# Patient Record
Sex: FEMALE
[De-identification: names, ages, dates, MRNs, and addresses within clinical notes are randomized per-mention and may not be internally consistent; named-entity substitution may affect disease eponyms.]

---

## 2020-10-14 PROBLEM — Z00.00 ENCOUNTER FOR PREVENTIVE HEALTH EXAMINATION: Status: ACTIVE | Noted: 2020-10-14

## 2020-10-21 ENCOUNTER — APPOINTMENT (OUTPATIENT)
Dept: ENDOCRINOLOGY | Facility: CLINIC | Age: 55
End: 2020-10-21
Payer: MEDICAID

## 2020-10-21 VITALS
BODY MASS INDEX: 23.92 KG/M2 | DIASTOLIC BLOOD PRESSURE: 78 MMHG | WEIGHT: 130 LBS | OXYGEN SATURATION: 99 % | HEIGHT: 62 IN | HEART RATE: 72 BPM | SYSTOLIC BLOOD PRESSURE: 120 MMHG

## 2020-10-21 DIAGNOSIS — Z78.9 OTHER SPECIFIED HEALTH STATUS: ICD-10-CM

## 2020-10-21 DIAGNOSIS — Z83.3 FAMILY HISTORY OF DIABETES MELLITUS: ICD-10-CM

## 2020-10-21 DIAGNOSIS — Z87.891 PERSONAL HISTORY OF NICOTINE DEPENDENCE: ICD-10-CM

## 2020-10-21 LAB — GLUCOSE BLDC GLUCOMTR-MCNC: 98

## 2020-10-21 PROCEDURE — 99205 OFFICE O/P NEW HI 60 MIN: CPT | Mod: 25

## 2020-10-21 PROCEDURE — 82962 GLUCOSE BLOOD TEST: CPT

## 2020-10-21 RX ORDER — ATORVASTATIN CALCIUM 10 MG/1
10 TABLET, FILM COATED ORAL DAILY
Qty: 90 | Refills: 3 | Status: ACTIVE | COMMUNITY
Start: 2020-10-21 | End: 1900-01-01

## 2020-10-21 RX ORDER — GLIMEPIRIDE 4 MG/1
4 TABLET ORAL DAILY
Qty: 90 | Refills: 3 | Status: ACTIVE | COMMUNITY
Start: 2020-10-21 | End: 1900-01-01

## 2021-02-10 ENCOUNTER — RX CHANGE (OUTPATIENT)
Age: 56
End: 2021-02-10

## 2021-02-10 RX ORDER — SITAGLIPTIN 100 MG/1
100 TABLET, FILM COATED ORAL DAILY
Qty: 90 | Refills: 3 | Status: DISCONTINUED | COMMUNITY
Start: 2020-10-21 | End: 2021-02-10

## 2021-03-15 ENCOUNTER — NON-APPOINTMENT (OUTPATIENT)
Age: 56
End: 2021-03-15

## 2021-03-16 LAB
HBA1C MFR BLD HPLC: 10.1
LDLC SERPL DIRECT ASSAY-MCNC: 106
MICROALBUMIN/CREAT 24H UR-RTO: 4

## 2021-03-17 ENCOUNTER — APPOINTMENT (OUTPATIENT)
Dept: ENDOCRINOLOGY | Facility: CLINIC | Age: 56
End: 2021-03-17
Payer: MEDICAID

## 2021-03-17 ENCOUNTER — RX CHANGE (OUTPATIENT)
Age: 56
End: 2021-03-17

## 2021-03-17 VITALS
HEIGHT: 62 IN | DIASTOLIC BLOOD PRESSURE: 82 MMHG | HEART RATE: 99 BPM | WEIGHT: 125 LBS | BODY MASS INDEX: 23 KG/M2 | SYSTOLIC BLOOD PRESSURE: 122 MMHG | OXYGEN SATURATION: 99 %

## 2021-03-17 DIAGNOSIS — E78.5 HYPERLIPIDEMIA, UNSPECIFIED: ICD-10-CM

## 2021-03-17 DIAGNOSIS — E03.9 HYPOTHYROIDISM, UNSPECIFIED: ICD-10-CM

## 2021-03-17 LAB — GLUCOSE BLDC GLUCOMTR-MCNC: 266

## 2021-03-17 PROCEDURE — 82962 GLUCOSE BLOOD TEST: CPT

## 2021-03-17 PROCEDURE — 99214 OFFICE O/P EST MOD 30 MIN: CPT | Mod: 25

## 2021-03-17 PROCEDURE — 99072 ADDL SUPL MATRL&STAF TM PHE: CPT

## 2021-03-17 RX ORDER — INSULIN LISPRO 100 [IU]/ML
100 INJECTION, SOLUTION INTRAVENOUS; SUBCUTANEOUS
Qty: 45 | Refills: 1 | Status: DISCONTINUED | COMMUNITY
Start: 2021-03-17 | End: 2021-03-17

## 2021-03-17 RX ORDER — BLOOD-GLUCOSE METER
KIT MISCELLANEOUS
Qty: 1 | Refills: 0 | Status: ACTIVE | COMMUNITY
Start: 2021-03-17 | End: 1900-01-01

## 2021-03-17 RX ORDER — ELECTROLYTES/DEXTROSE
32G X 4 MM SOLUTION, ORAL ORAL
Qty: 200 | Refills: 1 | Status: ACTIVE | COMMUNITY
Start: 2021-03-17 | End: 1900-01-01

## 2021-03-17 RX ORDER — ALOGLIPTIN 25 MG/1
25 TABLET, FILM COATED ORAL
Qty: 0 | Refills: 0 | Status: DISCONTINUED | COMMUNITY
Start: 2021-02-10 | End: 2021-03-17

## 2021-03-17 RX ORDER — INSULIN LISPRO 100 U/ML
100 INJECTION, SOLUTION INTRAVENOUS; SUBCUTANEOUS
Qty: 10 | Refills: 0 | Status: DISCONTINUED | COMMUNITY
Start: 2021-03-17 | End: 2021-03-17

## 2021-03-17 RX ORDER — BLOOD SUGAR DIAGNOSTIC
STRIP MISCELLANEOUS
Qty: 200 | Refills: 1 | Status: ACTIVE | COMMUNITY
Start: 2021-03-17 | End: 1900-01-01

## 2021-03-17 RX ORDER — ALOGLIPTIN 25 MG/1
25 TABLET, FILM COATED ORAL DAILY
Qty: 90 | Refills: 3 | Status: DISCONTINUED | COMMUNITY
Start: 2020-11-02 | End: 2021-03-17

## 2021-03-17 RX ORDER — BLOOD SUGAR DIAGNOSTIC
STRIP MISCELLANEOUS
Qty: 200 | Refills: 1 | Status: DISCONTINUED | COMMUNITY
Start: 2021-03-17 | End: 2021-03-17

## 2021-03-17 NOTE — ASSESSMENT
[FreeTextEntry1] : Ms Goldsmith is a 54 yr old female, who presents today for follow up  in regards type  2 diabetes.  \par Per patient , has been diabetic since 2006.Was on oral meds for 10 years then started insulin.\par No Family history of diabetes\par \par Currently taking  metformin 1000 mg daily, glimepiride 4 mg daily,lantus 20 units daily.\par She tells me she ran out of metformin few months ago, has not been on it.\par \par FS in office  266\par current severity: uncontrolled\par A1C 9.3% , 10.1% in  3/2021\par Postprandials high.\par Discussed with her risks of high sugars in detail, will start her on mealtime insulin.\par \par \par Medication changes:\par To restart metformin daily, to take lantus 20 units daily.\par To start humalog 5 units tid with meals.\par  To check sugars 4 x a day  at different times.\par To see CDEin 2 to 3 weeks with log for insulin dose adjustment\par Diet and exercise reviewed.\par Hypoglycemia reviewed.\par  Eyes: no  active issues,  will schedule for eye exam, reminded agin\par Feet: no active issues, no neuropathy.  Diabetic foot care reviewed.\par \par  Lipids:Dyslipidemia: not   on statin/ anti-lipid treatment. Last LDL: 106, not at goal, was started on  lipitor 10 mg daily, but admits to not taking it regularly, recommended ot take it regularly,   to repeat levels next visit.\par \par  Renal/ B/P : B/P wnl , not  on ACE/ ARB.No  proteinuria.  \par \par Weight:   underwieght.  Balanced diet and exercise reviewed.\par \par Hypothyroidism  :Recent TSH normal, continue same dose, will repeat TSH next visit.\par \par  Diabetes counselling: \par The patient was counseled on diabetes foot care, long term vascular complications of diabetes, carbohydrate consistent diet, importance of diet and exercise to improve glycemic control, achieve weight loss and improve cardiovascular health and action and use of short and long-acting insulin. Patient was referred to ophthalmology for retinopathy screening. ADA diet (30-50 gm carbohydrates with each meal, 15 grams with snacks. Balance with lean proteins and low fat diet.) Exercise daily per ability, work up to 30 minutes a day. Medication, risks and benefits reviewed. Glucose testing and insulin administration for glycemic management.\par \par \par FOLLOWUP@3 months\par

## 2021-03-17 NOTE — DATA REVIEWED
[FreeTextEntry1] : Outside labs reviewed:\par 3/12/2021:\par a1c:10.1%\par LDL:106\par TSH:0.857\par Albumin/Cr.:4

## 2021-03-17 NOTE — HISTORY OF PRESENT ILLNESS
[FreeTextEntry1] : Ms Goldsmith is a 54 yr old female, who presents today for follow up  in regards type  2 diabetes.  \par Per patient , has been diabetic since 2006.Was on oral meds for 10 years then started insulin.\par No Family history of diabetes\par \par Currently taking  metformin 1000 mg daily, glimepiride 4 mg daily,lantus 20 units daily.\par She tells me she ran out of metformin few months ago, has not been on it.\par \par FS in office  266\par current severity: uncontrolled\par A1C 9.3% , 10.1% in  3/2021\par \par \par Home Glucose Monitoring\par Frequency: 2 x a day\par BG Documentation:  per recall\par BG Readings -   Fasting 75 to to 100s\par                               HS     200s to 300s.\par \par Diet is good, but eats a lot of fruit, morning  is coffee, noon time meats, barley, wheat, sometimes sandwich, dinner s plantains, eggs.Does not snack much, drinks eater.\par \par Diabetic History\par ER Visits:  none\par Hospitalizations:   none\par Diet Therapy:watching his diet\par Diabetic Education:   no\par Exercise:   mod. active,\par Last eye exam: 2 years ago.\par Has to schedule \par \par Per patient she was diagnosed  with hypothyroidism  in 2008.\par Has been on replacement since then.\par Currently on levothyroxine 25 mcg daily , takes it empty stomach in morning, denies missing any doses. \par No family h/o thyroid disorder or cancer.no h/o neck radiation. No dysphagia, no SOB.Denies heat or cold intolerance, no weight changes, no constipation or diarrhea, no palpitations, energy level fair, no skin or hair changes.\par \par \par \par \par \par \par

## 2021-03-17 NOTE — PHYSICAL EXAM
[Alert] : alert [Well Nourished] : well nourished [No Acute Distress] : no acute distress [Normal Sclera/Conjunctiva] : normal sclera/conjunctiva [No Proptosis] : no proptosis [No Lid Lag] : no lid lag [No Neck Mass] : no neck mass was observed [Supple] : the neck was supple [Thyroid Not Enlarged] : the thyroid was not enlarged [No Thyroid Nodules] : no palpable thyroid nodules [No Respiratory Distress] : no respiratory distress [No Accessory Muscle Use] : no accessory muscle use [Normal Rate and Effort] : normal respiratory rate and effort [Clear to Auscultation] : lungs were clear to auscultation bilaterally [Normal S1, S2] : normal S1 and S2 [No Murmurs] : no murmurs [Normal Rate] : heart rate was normal [Regular Rhythm] : with a regular rhythm [Normal Bowel Sounds] : normal bowel sounds [Not Tender] : non-tender [Not Distended] : not distended [Soft] : abdomen soft [Normal Supraclavicular Nodes] : no supraclavicular lymphadenopathy [Normal Anterior Cervical Nodes] : no anterior cervical lymphadenopathy [Normal Gait] : normal gait [No Clubbing, Cyanosis] : no clubbing  or cyanosis of the fingernails [No Joint Swelling] : no joint swelling seen [No Rash] : no rash [No Skin Lesions] : no skin lesions [Right Foot Was Examined] : right foot ~C was examined [Left Foot Was Examined] : left foot ~C was examined [Normal] : normal [2+] : 2+ in the dorsalis pedis [No Motor Deficits] : the motor exam was normal [No Tremors] : no tremors [Oriented x3] : oriented to person, place, and time [Normal Affect] : the affect was normal [Normal Insight/Judgement] : insight and judgment were intact [Normal Mood] : the mood was normal [Acanthosis Nigricans] : no acanthosis nigricans [Diminished Throughout Both Feet] : normal tactile sensation with monofilament testing throughout both feet

## 2021-03-19 ENCOUNTER — RX CHANGE (OUTPATIENT)
Age: 56
End: 2021-03-19

## 2021-03-19 RX ORDER — METFORMIN HYDROCHLORIDE 1000 MG/1
1000 TABLET, COATED ORAL
Qty: 180 | Refills: 1 | Status: ACTIVE | COMMUNITY
Start: 1900-01-01 | End: 1900-01-01

## 2021-03-19 RX ORDER — LEVOTHYROXINE SODIUM 0.03 MG/1
25 TABLET ORAL DAILY
Qty: 90 | Refills: 1 | Status: ACTIVE | COMMUNITY
Start: 1900-01-01 | End: 1900-01-01

## 2021-03-22 ENCOUNTER — RX CHANGE (OUTPATIENT)
Age: 56
End: 2021-03-22

## 2021-03-22 RX ORDER — INSULIN GLARGINE 100 [IU]/ML
100 INJECTION, SOLUTION SUBCUTANEOUS
Qty: 15 | Refills: 1 | Status: DISCONTINUED | COMMUNITY
Start: 2021-03-22 | End: 2021-03-22

## 2021-03-23 ENCOUNTER — RX CHANGE (OUTPATIENT)
Age: 56
End: 2021-03-23

## 2021-03-23 RX ORDER — INSULIN GLARGINE 100 [IU]/ML
100 INJECTION, SOLUTION SUBCUTANEOUS
Qty: 1 | Refills: 0 | Status: ACTIVE | COMMUNITY
Start: 2021-03-22 | End: 1900-01-01

## 2021-03-30 RX ORDER — INSULIN LISPRO 100 U/ML
100 INJECTION, SOLUTION INTRAVENOUS; SUBCUTANEOUS
Qty: 10 | Refills: 1 | Status: DISCONTINUED | COMMUNITY
Start: 2021-03-17 | End: 2021-03-30

## 2021-03-30 RX ORDER — PEN NEEDLE, DIABETIC 32GX 5/32"
32G X 4 MM NEEDLE, DISPOSABLE MISCELLANEOUS
Qty: 4 | Refills: 1 | Status: ACTIVE | COMMUNITY
Start: 2021-03-30 | End: 1900-01-01

## 2021-04-14 ENCOUNTER — APPOINTMENT (OUTPATIENT)
Dept: ENDOCRINOLOGY | Facility: CLINIC | Age: 56
End: 2021-04-14
Payer: MEDICAID

## 2021-04-14 DIAGNOSIS — E11.9 TYPE 2 DIABETES MELLITUS W/OUT COMPLICATIONS: ICD-10-CM

## 2021-04-14 DIAGNOSIS — Z79.4 TYPE 2 DIABETES MELLITUS W/OUT COMPLICATIONS: ICD-10-CM

## 2021-04-14 PROCEDURE — G0108 DIAB MANAGE TRN  PER INDIV: CPT

## 2021-04-14 PROCEDURE — 99072 ADDL SUPL MATRL&STAF TM PHE: CPT

## 2021-05-04 ENCOUNTER — RX RENEWAL (OUTPATIENT)
Age: 56
End: 2021-05-04

## 2021-05-04 RX ORDER — INSULIN LISPRO 100 U/ML
100 INJECTION, SOLUTION SUBCUTANEOUS
Qty: 3 | Refills: 0 | Status: ACTIVE | COMMUNITY
Start: 2021-03-30 | End: 1900-01-01

## 2021-07-13 ENCOUNTER — APPOINTMENT (OUTPATIENT)
Dept: ENDOCRINOLOGY | Facility: CLINIC | Age: 56
End: 2021-07-13